# Patient Record
Sex: MALE | Race: WHITE | NOT HISPANIC OR LATINO | URBAN - METROPOLITAN AREA
[De-identification: names, ages, dates, MRNs, and addresses within clinical notes are randomized per-mention and may not be internally consistent; named-entity substitution may affect disease eponyms.]

---

## 2019-07-01 ENCOUNTER — EMERGENCY (EMERGENCY)
Facility: HOSPITAL | Age: 29
LOS: 1 days | Discharge: HOME | End: 2019-07-01
Attending: EMERGENCY MEDICINE
Payer: SELF-PAY

## 2019-07-01 VITALS
OXYGEN SATURATION: 99 % | TEMPERATURE: 98 F | RESPIRATION RATE: 17 BRPM | WEIGHT: 165.35 LBS | SYSTOLIC BLOOD PRESSURE: 138 MMHG | HEIGHT: 68.11 IN | DIASTOLIC BLOOD PRESSURE: 92 MMHG | HEART RATE: 91 BPM

## 2019-07-01 PROCEDURE — 99283 EMERGENCY DEPT VISIT LOW MDM: CPT

## 2019-07-01 RX ORDER — POLYMYXIN B SULF/TRIMETHOPRIM 10000-1/ML
1 DROPS OPHTHALMIC (EYE) ONCE
Refills: 0 | Status: COMPLETED | OUTPATIENT
Start: 2019-07-01 | End: 2019-07-01

## 2019-07-01 RX ADMIN — Medication 1 DROP(S): at 19:30

## 2019-07-01 NOTE — ED PROVIDER NOTE - CLINICAL SUMMARY MEDICAL DECISION MAKING FREE TEXT BOX
29 M with jean-claude eye pain and discharge x 3 days. Will give pt eye drops and d/c pt home. Pt declined care coordinator service to set up f/u. Given return precautions. 29 M with jean-claude eye pain and discharge x 3 days. Will treat bacterial conjunctivitis with polytrim drops and d/c pt home. Pt declined care coordinator service to set up f/u. Given return precautions.

## 2019-07-01 NOTE — ED ADULT NURSE NOTE - OBJECTIVE STATEMENT
Pt c/o both eye pain with discharge for 3 days. Pt denies blurry vision. No acute distress noted, denies chest pain, no shortness of breath indicated. Safety maintained.

## 2019-07-01 NOTE — ED ADULT TRIAGE NOTE - STATUS:
Nurse received In-Basket message as follows:    Kellen Greenberg sent to [facility] Rn Pool             Caller: Johnson     Relationship to Patient: pt     Call Back Number: [phone]     Reason for Call: Would like a call back to discuss lab work.      Nurse returned call to patient at number listed in message; received oud dial tone without eventual ring.    Check of demographic information reveals same number.    Will attempt call again at later time.   Applied

## 2019-07-01 NOTE — ED PROVIDER NOTE - BILATERAL EYES
pupils equal, round, and reactive to light/jean-claude conjunctival injection, no obvious discharge noted, vision grossly intact, increased tear production, EOMI

## 2019-07-01 NOTE — ED PROVIDER NOTE - OBJECTIVE STATEMENT
Bernardo, ID: [id number]  28 y/o male with no significant PMHx presents to the ED c/o eye pain/discharge x 3 days. Pt notes sudden onset of burning sensation to jean-claude eyes and discharge from jean-claude eyes. Pt notes the next day, continued burning sensation with redness and swelling to his jean-claude eyes and discharge. Pt notes he has to make an effort in the morning to open his eyes. Pt denies blurry vision, fever, or any other complaints. NKDA.
